# Patient Record
Sex: MALE | Race: BLACK OR AFRICAN AMERICAN | Employment: FULL TIME | URBAN - METROPOLITAN AREA
[De-identification: names, ages, dates, MRNs, and addresses within clinical notes are randomized per-mention and may not be internally consistent; named-entity substitution may affect disease eponyms.]

---

## 2017-09-24 ENCOUNTER — HOSPITAL ENCOUNTER (EMERGENCY)
Age: 32
Discharge: HOME | End: 2017-09-24
Attending: EMERGENCY MEDICINE
Payer: MEDICAID

## 2017-09-24 VITALS
WEIGHT: 120.19 LBS | HEIGHT: 67 IN | TEMPERATURE: 98.8 F | BODY MASS INDEX: 18.87 KG/M2 | DIASTOLIC BLOOD PRESSURE: 80 MMHG | HEART RATE: 96 BPM | SYSTOLIC BLOOD PRESSURE: 140 MMHG | OXYGEN SATURATION: 97 %

## 2017-09-24 DIAGNOSIS — K02.9 DENTAL CARIES: ICD-10-CM

## 2017-09-24 DIAGNOSIS — R05.8 PRODUCTIVE COUGH: ICD-10-CM

## 2017-09-24 DIAGNOSIS — K08.89 PAIN, DENTAL: Primary | ICD-10-CM

## 2017-09-24 DIAGNOSIS — J44.9 COPD (CHRONIC OBSTRUCTIVE PULMONARY DISEASE) (HCC): ICD-10-CM

## 2017-09-24 DIAGNOSIS — J45.909 ASTHMA: ICD-10-CM

## 2017-09-24 PROCEDURE — 99281 EMR DPT VST MAYX REQ PHY/QHP: CPT

## 2017-09-24 RX ORDER — AMOXICILLIN 500 MG/1
500 TABLET, FILM COATED ORAL 3 TIMES DAILY
Qty: 30 TAB | Refills: 0 | OUTPATIENT
Start: 2017-09-24 | End: 2022-10-06

## 2017-09-24 RX ORDER — BENZONATATE 200 MG/1
200 CAPSULE ORAL
Qty: 30 CAP | Refills: 0 | Status: SHIPPED | OUTPATIENT
Start: 2017-09-24 | End: 2017-10-01

## 2017-09-24 RX ORDER — ALBUTEROL SULFATE 90 UG/1
2 AEROSOL, METERED RESPIRATORY (INHALATION)
Qty: 1 INHALER | Refills: 0 | OUTPATIENT
Start: 2017-09-24 | End: 2022-10-25

## 2017-09-24 RX ORDER — HYDROCODONE BITARTRATE AND ACETAMINOPHEN 5; 325 MG/1; MG/1
1 TABLET ORAL
Qty: 12 TAB | Refills: 0 | OUTPATIENT
Start: 2017-09-24 | End: 2022-10-25

## 2017-09-24 NOTE — DISCHARGE INSTRUCTIONS
[employer] Activation    Thank you for requesting access to [employer]. Please follow the instructions below to securely access and download your online medical record. [employer] allows you to send messages to your doctor, view your test results, renew your prescriptions, schedule appointments, and more. How Do I Sign Up? 1. In your internet browser, go to [email or web address]  2. Click on the First Time User? Click Here link in the Sign In box. You will be redirect to the New Member Sign Up page. 3. Enter your [employer] Access Code exactly as it appears below. You will not need to use this code after youve completed the sign-up process. If you do not sign up before the expiration date, you must request a new code. [employer] Access Code: [email or web address]  Expires: 2017  7:33 PM (This is the date your [employer] access code will )    4. Enter the last four digits of your Social Security Number (xxxx) and Date of Birth (mm/dd/yyyy) as indicated and click Submit. You will be taken to the next sign-up page. 5. Create a [employer] ID. This will be your [employer] login ID and cannot be changed, so think of one that is secure and easy to remember. 6. Create a [employer] password. You can change your password at any time. 7. Enter your Password Reset Question and Answer. This can be used at a later time if you forget your password. 8. Enter your e-mail address. You will receive e-mail notification when new information is available in [address]. 9. Click Sign Up. You can now view and download portions of your medical record. 10. Click the Download Summary menu link to download a portable copy of your medical information. Additional Information    If you have questions, please visit the Frequently Asked Questions section of the [employer] website at [email or web address] Remember, [employer] is NOT to be used for urgent needs. For medical emergencies, dial 911.

## 2017-09-24 NOTE — ED PROVIDER NOTES
HPI Comments: 7:08 PM  The patient is a 36 y.o. Male, current daily smoker and marijuana user with hx of asthma, COPD, schizophrenia, bronchitis and sickle cell disease who presents to the ED with10/10 severe, constant left lower dental pain with associated ear pain x 1 day and with productive cough x 2 weeks. Pt explains that his cough first started when he moved here from [facility]. Has tried OTC medication for cough with no improvement. No f/c, n/v/d, cp reported. Does not have a dentist.  Reports allergic reaction to ibuprofen and asa. The history is provided by the patient. No  was used. Past Medical History:   Diagnosis Date    Arthritis     back problems    Asthma     Bipolar 1 disorder ([address]     COPD     Musculoskeletal disorder     Other ill-defined conditions(799.89)     scoliosis    Other ill-defined conditions(799.89)     sickle cell trait    Other ill-defined conditions(799.89)     chronic back pain    Psychiatric disorder     anxiety    Schizophrenia ([facility]     Scoliosis     Sickle cell anemia (HCC)        No past surgical history on file. No family history on file. Social History     Social History    Marital status: SINGLE     Spouse name: N/A    Number of children: N/A    Years of education: N/A     Occupational History    Not on file. Social History Main Topics    Smoking status: Current Every Day Smoker     Packs/day: 0.50     Years: 16.00     Types: Cigarettes    Smokeless tobacco: Not on file    Alcohol use No    Drug use: Yes     Special: Marijuana    Sexual activity: Yes     Partners: Female     Other Topics Concern    Not on file     Social History Narrative         ALLERGIES: Aspirin and Ibuprofen    Review of Systems   Constitutional: Negative. Negative for chills, diaphoresis, fatigue and fever. HENT: Positive for dental problem and ear pain. Negative for congestion, facial swelling and sore throat.          Left lower dental pain   Eyes: Negative. Negative for pain and redness. Respiratory: Positive for cough (productive). Negative for shortness of breath, wheezing and stridor. Cardiovascular: Negative. Negative for chest pain. Gastrointestinal: Negative. Negative for abdominal pain, diarrhea, nausea and vomiting. Endocrine: Negative. Genitourinary: Negative. Negative for dysuria, flank pain, frequency and hematuria. Musculoskeletal: Negative. Negative for back pain, myalgias, neck pain and neck stiffness. Skin: Negative. Negative for rash and wound. Allergic/Immunologic: Negative. Neurological: Negative. Negative for dizziness, seizures, syncope and headaches. Hematological: Negative. Psychiatric/Behavioral: Negative. All other systems reviewed and are negative. There were no vitals filed for this visit. Physical Exam   Constitutional: He is oriented to person, place, and time. He appears well-developed and well-nourished. No distress. HENT:   Head: Normocephalic. Right Ear: External ear normal.   Nose: Nose normal.   Mouth/Throat: Oropharynx is clear and moist.   Dental caries noted to the L lower molar region, no abscess noted, airway is patent, able to clear secretions, no trismus or uvular deviation noted. Neck: Normal range of motion. Neck supple. Cardiovascular: Normal rate, regular rhythm and normal heart sounds. Exam reveals no gallop and no friction rub. No murmur heard. Pulmonary/Chest: Effort normal and breath sounds normal. No stridor. No respiratory distress. He has no wheezes. He has no rales. Musculoskeletal: Normal range of motion. Lymphadenopathy:     He has no cervical adenopathy. Neurological: He is alert and oriented to person, place, and time. Coordination normal.   Gait is steady. Able to ambulate without difficulty. Skin: Skin is warm and dry. No rash noted. He is not diaphoretic. No erythema.    Psychiatric: He has a normal mood and affect. His behavior is normal. Thought content normal.   Nursing note and vitals reviewed. MDM  Number of Diagnoses or Management Options  Diagnosis management comments: Impression:  Productive cough, dental caries, dental pain    Patient is stable for discharge at this time. Rx for amoxicillin, norco, and refill of pt's albuterol given. Rest and follow-up with PCP/dentist this week. Return to the ED immediately for any new or worsening sx. Farnaz Regalado PA-C 7:26 PM     Risk of Complications, Morbidity, and/or Mortality  Presenting problems: low  Diagnostic procedures: low  Management options: low    Patient Progress  Patient progress: stable    ED Course       Procedures         Scribe Attestation      Shantell Medellin acting as a scribe for and in the presence of [place], [place]      September 24, 2017 at 7:19 [facility]       Provider Attestation:      I personally performed the services described in the documentation, reviewed the documentation, as recorded by the scribe in my presence, and it accurately and completely records my words and actions.  September 24, 2017 at 7:19 PM - [place], [place]

## 2022-10-06 ENCOUNTER — HOSPITAL ENCOUNTER (EMERGENCY)
Age: 37
Discharge: HOME | End: 2022-10-06
Attending: EMERGENCY MEDICINE
Payer: MEDICAID

## 2022-10-06 VITALS
DIASTOLIC BLOOD PRESSURE: 91 MMHG | HEIGHT: 67 IN | HEART RATE: 73 BPM | SYSTOLIC BLOOD PRESSURE: 130 MMHG | WEIGHT: 140 LBS | RESPIRATION RATE: 19 BRPM | TEMPERATURE: 98.2 F | OXYGEN SATURATION: 100 % | BODY MASS INDEX: 21.97 KG/M2

## 2022-10-06 DIAGNOSIS — J01.90 ACUTE BACTERIAL SINUSITIS: Primary | ICD-10-CM

## 2022-10-06 DIAGNOSIS — B96.89 ACUTE BACTERIAL SINUSITIS: Primary | ICD-10-CM

## 2022-10-06 DIAGNOSIS — H60.393 OTHER INFECTIVE ACUTE OTITIS EXTERNA OF BOTH EARS: ICD-10-CM

## 2022-10-06 DIAGNOSIS — Z72.0 TOBACCO ABUSE: ICD-10-CM

## 2022-10-06 PROCEDURE — 99283 EMERGENCY DEPT VISIT LOW MDM: CPT

## 2022-10-06 RX ORDER — FLUTICASONE PROPIONATE 50 MCG
2 SPRAY, SUSPENSION (ML) NASAL DAILY
Qty: 1 EACH | Refills: 0 | Status: SHIPPED | OUTPATIENT
Start: 2022-10-06

## 2022-10-06 RX ORDER — AMOXICILLIN AND CLAVULANATE POTASSIUM 875; 125 MG/1; MG/1
1 TABLET, FILM COATED ORAL 2 TIMES DAILY
Qty: 20 TABLET | Refills: 0 | Status: SHIPPED | OUTPATIENT
Start: 2022-10-06 | End: 2022-10-16

## 2022-10-06 NOTE — Clinical Note
[facility] EMERGENCY DEPT  [address] [id number] [phone]    Work/School Note    Date: 10/6/2022    To Whom It May concern:      João Guajardo was seen and treated today in the emergency room by the following provider(s):  Attending Provider: Ethan Villarreal MD.      João Guajardo is excused from work/school on 10/06/22. He is clear to return to work/school on 10/07/22.         Sincerely,          Mariia Gonzalez MD

## 2022-10-06 NOTE — ED PROVIDER NOTES
EMERGENCY DEPARTMENT HISTORY AND PHYSICAL EXAM      Date: 10/6/2022  Patient Name: Gabby Burris    History of Presenting Illness     Chief Complaint   Patient presents with    Sinus Pain     Per pt reports left sided facial pain, +nasal pain and left ear pain x 1 week, recently seen at [facility] and dx with sinus infection, was unable to get antibiotic prescription filled. History Provided By: Patient    HPI: Gabby Burris, 30 y.o. male with past medical history significant for arthritis, asthma, bipolar, COPD, scoliosis, and sickle cell trait who presents via private vehicle to the ED with cc of headache and pain behind the left eye for the past week. Patient tells me he was seen at [facility] emergency department last week and prescribed antibiotics and a nasal spray but did not get the medications filled because the pharmacy was out of them. He not know what medications he was prescribed but states his symptoms have progressively gotten worse. His headache is described as a pressure/aching pain that does not radiate. He also endorses left ear pain and fullness as well as nasal congestion. He denies any fevers, chills, cough, shortness of breath, or sore throat. PMHx: Arthritis, asthma, bipolar disorder, COPD, scoliosis, and sickle cell trait  Social Hx: Smokes 1/2 pack/day, denies alcohol use, occasional marijuana use    PCP: None    There are no other complaints, changes, or physical findings at this time. No current facility-administered medications on file prior to encounter. Current Outpatient Medications on File Prior to Encounter   Medication Sig Dispense Refill    HYDROcodone-acetaminophen (NORCO) 5-325 mg per tablet Take 1 Tab by mouth every four (4) hours as needed for Pain. Max Daily Amount: 6 Tabs. 12 Tab 0    albuterol (PROVENTIL HFA, VENTOLIN HFA, PROAIR HFA) 90 mcg/actuation inhaler Take 2 Puffs by inhalation every four (4) hours as needed for Wheezing.  1 Inhaler 0    [DISCONTINUED] amoxicillin 500 mg tab Take 500 mg by mouth three (3) times daily. 30 Tab 0    predniSONE (STERAPRED) 5 mg dose pack See administration instruction per 5mg dose pack 21 Tab 0    ipratropium-albuterol (COMBIVENT RESPIMAT)  mcg/actuation inhaler Take 1 Puff by inhalation every six (6) hours. 1 Inhaler 3    famotidine (PEPCID) 20 mg tablet Take 1 Tab by mouth two (2) times a day. 20 Tab 0    risperiDONE (RISPERDAL) 3 mg tablet Take 1 Tab by mouth two (2) times a day. 60 Tab 3    benztropine (COGENTIN) 1 mg tablet Take 0.5 Tabs by mouth two (2) times a day. 60 Tab 3    ALPRAZOLAM (XANAX PO) Take  by mouth. Past History     Past Medical History:  Past Medical History:   Diagnosis Date    Arthritis     back problems    Asthma     Bipolar 1 disorder ([facility]     COPD     Musculoskeletal disorder     Other ill-defined conditions     scoliosis    Other ill-defined conditions     sickle cell trait    Other ill-defined conditions     chronic back pain    Psychiatric disorder     anxiety    Sch[facility]ia ([facility]     Scoliosis     Sickle cell anemia (HCC)      Past Surgical History:  Past Surgical History:   Procedure Laterality Date    HX APPENDECTOMY       Family History:  No family history on file. Social History:  Social History     Tobacco Use    Smoking status: Every Day     Packs/day: 0.50     Years: 16.00     Pack years: 8.00     Types: Cigarettes    Smokeless tobacco: Never   Substance Use Topics    Alcohol use: No    Drug use: Yes     Types: Marijuana     Allergies: Allergies   Allergen Reactions    Aspirin Swelling    Ibuprofen Hives    Tylenol [Acetaminophen] Hives     Review of Systems   Review of Systems   Constitutional:  Negative for chills and fever. HENT:  Positive for congestion, ear pain, sinus pressure and sinus pain. Negative for hearing loss, rhinorrhea, sneezing, sore throat and voice change. Eyes:  Negative for redness and visual disturbance.    Respiratory: Negative for shortness of breath. Cardiovascular:  Negative for chest pain and leg swelling. Gastrointestinal:  Negative for abdominal pain, nausea and vomiting. Genitourinary:  Negative for difficulty urinating and frequency. Musculoskeletal:  Negative for back pain, myalgias and neck stiffness. Skin:  Negative for rash. Neurological:  Positive for headaches. Negative for dizziness, syncope and weakness. Hematological:  Negative for adenopathy. All other systems reviewed and are negative. Physical Exam   Physical Exam  Vitals and nursing note reviewed. Constitutional:       Appearance: Normal appearance. He is well-developed. HENT:      Head: Normocephalic and atraumatic. Right Ear: Tympanic membrane normal.      Left Ear: Tympanic membrane normal.      Ears:      Comments: Erythema of the bilateral external auditory canals     Nose: Mucosal edema present. Right Turbinates: Swollen. Left Turbinates: Swollen. Cardiovascular:      Rate and Rhythm: Normal rate and regular rhythm. Pulses: Normal pulses. Heart sounds: Normal heart sounds. Pulmonary:      Effort: Pulmonary effort is normal. No respiratory distress. Breath sounds: Normal breath sounds. Chest:      Chest wall: No tenderness. Abdominal:      General: Bowel sounds are normal.      Palpations: Abdomen is soft. Tenderness: There is no abdominal tenderness. There is no guarding or rebound. Musculoskeletal:      Cervical back: Full passive range of motion without pain, normal range of motion and neck supple. Skin:     General: Skin is warm and dry. Findings: No erythema or rash. Neurological:      Mental Status: He is alert and oriented to person, place, and time. Psychiatric:         Speech: Speech normal.         Behavior: Behavior normal.         Thought Content:  Thought content normal.         Judgment: Judgment normal.     Diagnostic Study Results   Labs -   No results found for this or any previous visit (from the past 12 hour(s)). Radiologic Studies -   No orders to display     No results found. Medical Decision Making   I am the first provider for this patient. I reviewed the vital signs, available nursing notes, past medical history, past surgical history, family history and social history. Vital Signs-Reviewed the patient's vital signs. Patient Vitals for the past 24 hrs:   Temp Pulse Resp BP SpO2   10/06/22 0821 98.2 °F (36.8 °C) 73 19 (!) 130/91 100 %     Pulse Oximetry Analysis - 100% on RA (normal)    Records Reviewed: Nursing Notes and Old Medical Records    Provider Notes (Medical Decision Making):   72-year-old male presents with headache, facial pain, and sinus congestion for the past week. He has erythema of the bilateral external auditory canals but no signs of otitis media. Given the duration of his sinus symptoms, will treat with antibiotics and Flonase and have him follow-up with outpatient primary care. ED Course:   Initial assessment performed. The patients presenting problems have been discussed, and they are in agreement with the care plan formulated and outlined with them. I have encouraged them to ask questions as they arise throughout their visit. Tobacco Cessation Counseling   I spent 5 minutes discussing the medical risks of prolonged smoking habits and advised the patient of the benefits of the cessation of smoking, providing specific suggestions on how to quit. Miquel Figueroa MD    Progress Note:   Updated pt on all returned results and findings. Discussed the importance of proper follow up as referred below along with return precautions. Pt in agreement with the care plan and expresses agreement with and understanding of all items discussed. Disposition:  Discharge Note:  The pt is ready for discharge. The pt's signs, symptoms, diagnosis, and discharge instructions have been discussed and pt has conveyed their understanding.  The pt is to follow up as recommended or return to ER should their symptoms worsen. Plan has been discussed and pt is in agreement. PLAN:  1. Current Discharge Medication List        START taking these medications    Details   amoxicillin-clavulanate (Augmentin) 875-125 mg per tablet Take 1 Tablet by mouth two (2) times a day for 10 days. Qty: 20 Tablet, Refills: 0  Start date: 10/6/2022, End date: 10/16/2022      fluticasone propionate (FLONASE) 50 mcg/actuation nasal spray 2 Sprays by Both Nostrils route daily. Qty: 1 Each, Refills: 0  Start date: 10/6/2022           2. Follow-up Information       Follow up With Specialties Details Why [address]  Call  to arrange primary care [address]  [facility], [address] [place] [address] [address]    [address]  Call  to arrange primary care [place]  [phone]    [facility] EMERGENCY DEPT Emergency Medicine  As needed, If symptoms worsen [facility]  [phone]          Return to ED if worse     Diagnosis     Clinical Impression:   1. Acute bacterial sinusitis    2. Other infective acute otitis externa of both ears    3. Tobacco abuse            Please note that this dictation was completed with Dragon, computer voice recognition software. Quite often unanticipated grammatical, syntax, homophones, and other interpretive errors are inadvertently transcribed by the computer software. Please disregard these errors. Additionally, please excuse any errors that have escaped final proofreading.

## 2022-10-06 NOTE — ED NOTES
Patient presents with headache, left eye pain X1 week. States he was seen at an outside facility last week and sent home on abts and nasal spray. Has been unable to get the abt d/t [facility] being out of stock. States the sinus pain has been increasing since then. Emergency Department Nursing Plan of Care       The Nursing Plan of Care is developed from the Nursing assessment and Emergency Department Attending provider initial evaluation. The plan of care may be reviewed in the ED Provider note.     The Plan of Care was developed with the following considerations:   Patient / Family readiness to learn indicated by:verbalized understanding and appropriate questions asked  Persons(s) to be included in education: patient  Barriers to Learning/Limitations:No    Signed     Orlando Ash RN    10/6/2022   8:29 A

## 2022-10-25 ENCOUNTER — HOSPITAL ENCOUNTER (EMERGENCY)
Age: 37
Discharge: HOME | End: 2022-10-25
Attending: EMERGENCY MEDICINE
Payer: MEDICAID

## 2022-10-25 VITALS
RESPIRATION RATE: 18 BRPM | SYSTOLIC BLOOD PRESSURE: 148 MMHG | WEIGHT: 134 LBS | OXYGEN SATURATION: 100 % | TEMPERATURE: 98.2 F | HEART RATE: 73 BPM | BODY MASS INDEX: 21.03 KG/M2 | HEIGHT: 67 IN | DIASTOLIC BLOOD PRESSURE: 116 MMHG

## 2022-10-25 DIAGNOSIS — F17.200 SMOKING ADDICTION: ICD-10-CM

## 2022-10-25 DIAGNOSIS — J01.90 ACUTE SINUSITIS, RECURRENCE NOT SPECIFIED, UNSPECIFIED LOCATION: Primary | ICD-10-CM

## 2022-10-25 DIAGNOSIS — R03.0 ELEVATED BLOOD PRESSURE READING: ICD-10-CM

## 2022-10-25 PROCEDURE — 99283 EMERGENCY DEPT VISIT LOW MDM: CPT

## 2022-10-25 RX ORDER — ALBUTEROL SULFATE 90 UG/1
2 AEROSOL, METERED RESPIRATORY (INHALATION)
Qty: 1 EACH | Refills: 1 | Status: SHIPPED | OUTPATIENT
Start: 2022-10-25

## 2022-10-25 RX ORDER — PREDNISONE 10 MG/1
TABLET ORAL
Qty: 21 TABLET | Refills: 0 | Status: SHIPPED | OUTPATIENT
Start: 2022-10-25

## 2022-10-25 RX ORDER — CETIRIZINE HYDROCHLORIDE, PSEUDOEPHEDRINE HYDROCHLORIDE 5; 120 MG/1; MG/1
1 TABLET, FILM COATED, EXTENDED RELEASE ORAL 2 TIMES DAILY
Qty: 30 TABLET | Refills: 0 | Status: SHIPPED | OUTPATIENT
Start: 2022-10-25

## 2022-10-25 NOTE — ED PROVIDER NOTES
EMERGENCY DEPARTMENT HISTORY AND PHYSICAL EXAM      Please note that this dictation was completed with PixelFlow, the computer voice recognition software. Quite often unanticipated grammatical, syntax, homophones, and other interpretive errors are inadvertently transcribed by the computer software. Please disregard these errors. Please excuse any errors that have escaped final proofreading. Date: 10/25/2022  Patient Name: Dashawn Dunham    History of Presenting Illness     Chief Complaint   Patient presents with    Sinus Pain       History Provided By: Patient    HPI: Dashawn Dunham, 30 y.o. male with allergies to Tylenol, Ibuprofen, and Aspirin presents ambulatory to the ED with cc of several months of severe sinus pressure and congestion. Patient was seen 10/06/2022 for the same issue; he was prescribed Augmentin and Flonase but has not had relief of symptoms. Denies cough, N/V/D, chest pain or shortness of breath. Unfortunately, he does continue to smoke cigarettes. Chief Complaint: Sinus Pressure  Duration: 2-3 Months  Timing:  Constant  Location: Maxillary and Frontal sinuses  Quality: Pressure  Severity: Moderate  Modifying Factors: No relief with Benadryl, steam, or Flonase. Associated Symptoms: denies any other associated signs or symptoms    There are no other complaints, changes, or physical findings at this time. PCP: None    Current Outpatient Medications   Medication Sig Dispense Refill    predniSONE (STERAPRED DS) 10 mg dose pack Per Dose Pack instructions 21 Tablet 0    albuterol (PROVENTIL HFA, VENTOLIN HFA, PROAIR HFA) 90 mcg/actuation inhaler Take 2 Puffs by inhalation every four (4) hours as needed for Wheezing or Cough. 1 Each 1    cetirizine-pseudoePHEDrine (ZyrTEC-D) 5-120 mg Tb12 Take 1 Tablet by mouth two (2) times a day. 30 Tablet 0    fluticasone propionate (FLONASE) 50 mcg/actuation nasal spray 2 Sprays by Both Nostrils route daily.  1 Each 0    ipratropium-albuterol (COMBIVENT RESPIMAT)  mcg/actuation inhaler Take 1 Puff by inhalation every six (6) hours. 1 Inhaler 3    famotidine (PEPCID) 20 mg tablet Take 1 Tab by mouth two (2) times a day. 20 Tab 0    risperiDONE (RISPERDAL) 3 mg tablet Take 1 Tab by mouth two (2) times a day. 60 Tab 3    benztropine (COGENTIN) 1 mg tablet Take 0.5 Tabs by mouth two (2) times a day. 60 Tab 3    ALPRAZOLAM (XANAX PO) Take  by mouth. Past History     Past Medical History:  Past Medical History:   Diagnosis Date    Arthritis     back problems    Asthma     Bipolar 1 disorder ([facility] [address]     COPD     Musculoskeletal disorder     Other ill-defined conditions(799.89)     scoliosis    Other ill-defined conditions(799.89)     sickle cell trait    Other ill-defined conditions(799.89)     chronic back pain    Psychiatric disorder     anxiety    Schizophrenia ([facility] [address]     Scoliosis     Sickle cell anemia ([facility] [address]        Past Surgical History:  Past Surgical History:   Procedure Laterality Date    HX APPENDECTOMY         Family History:  History reviewed. No pertinent family history. Social History:  Social History     Tobacco Use    Smoking status: Every Day     Packs/day: 0.50     Years: 16.00     Pack years: 8.00     Types: Cigarettes    Smokeless tobacco: Never   Substance Use Topics    Alcohol use: No    Drug use: Yes     Types: Marijuana       Allergies: Allergies   Allergen Reactions    Aspirin Swelling    Ibuprofen Hives    Tylenol [Acetaminophen] Hives     Review of Systems   Review of Systems   Constitutional:  Positive for chills and fever (subjective). HENT:  Positive for congestion, sinus pressure and sinus pain. Negative for sore throat. Eyes:  Negative for pain. Respiratory:  Negative for shortness of breath. Cardiovascular:  Negative for chest pain. Gastrointestinal:  Negative for abdominal pain. Genitourinary:  Negative for flank pain. Musculoskeletal:  Negative for back pain. Skin:  Negative for rash. Allergic/Immunologic: Positive for environmental allergies. Neurological:  Negative for headaches. Physical Exam   Physical Exam  Vitals and nursing note reviewed. Constitutional:       General: He is not in acute distress. Appearance: He is well-developed. He is not toxic-appearing. HENT:      Head: Normocephalic and atraumatic. No right periorbital erythema or left periorbital erythema. Right Ear: Tympanic membrane, ear canal and external ear normal.      Left Ear: Tympanic membrane, ear canal and external ear normal.      Nose: Congestion present. Mouth/Throat:      Mouth: Mucous membranes are moist.   Eyes:      General: No scleral icterus. Right eye: No discharge. Left eye: No discharge. Conjunctiva/sclera: Conjunctivae normal.      Pupils: Pupils are equal, round, and reactive to light. Cardiovascular:      Rate and Rhythm: Normal rate and regular rhythm. Pulmonary:      Effort: Pulmonary effort is normal. No respiratory distress. Breath sounds: Normal breath sounds. Abdominal:      Palpations: Abdomen is soft. Tenderness: There is no abdominal tenderness. Musculoskeletal:         General: Normal range of motion. Cervical back: Normal range of motion. Skin:     General: Skin is warm and dry. Findings: No rash. Neurological:      General: No focal deficit present. Mental Status: He is alert and oriented to person, place, and time. Cranial Nerves: No cranial nerve deficit. Sensory: No sensory deficit. Psychiatric:         Speech: Speech normal.     Diagnostic Study Results     Labs -   No results found for this or any previous visit (from the past 12 hour(s)). Radiologic Studies -   No orders to display     CT Results  (Last 48 hours)      None          CXR Results  (Last 48 hours)      None          Medical Decision Making   I am the first provider for this patient.     I reviewed the vital signs, available nursing notes, past medical history, past surgical history, family history and social history. Vital Signs-Reviewed the patient's vital signs. Patient Vitals for the past 12 hrs:   Temp Pulse Resp BP SpO2   10/25/22 0931 98.2 °F (36.8 °C) 73 18 (!) 148/116 100 %       Pulse Oximetry Analysis - 100% on RA    Records Reviewed: Nursing Notes and Old Medical Records    Provider Notes (Medical Decision Making):   DDx: Viral URI, acute sinusitis, smoking addiction    Afebrile and well-appearing. Patient presents with sinus congestion and fullness for week or longer. He tells us he did complete a course of oral antibiotics with no improvement. On exam, he is well-appearing. He does have some frontal sinus pressure. Breathing is unlabored and lungs are clear. Additional testing deferred. We will offer medications to help improve his sinus pressure and congestion. He is urged to quit smoking. He is made aware that his blood pressure is high and he is encouraged to follow-up with primary care once outside of this environment. Return precautions for worsening symptoms or any concerns. TOBACCO COUNSELING:  Spent 1-5 minutes discussing the risks of smoking and the benefits of smoking cessation as well as the long term sequelae of smoking with the pt who verbalized his understanding. Reviewed strategies for success, including gradually decreasing the number of cigarettes smoked a day. HYPERTENSION COUNSELING:  Patient denies chest pain, headache, shortness of breath. Patient is made aware of their elevated blood pressure and is instructed to follow up this week with their Primary Care for a recheck. Patient is counseled regarding consequences of chronic, uncontrolled hypertension including kidney disease, heart disease, stroke or even death. Patient states their understanding. ED Course:   Initial assessment performed.  The patients presenting problems have been discussed, and they are in agreement with the care plan formulated and outlined with them. I have encouraged them to ask questions as they arise throughout their visit. Disposition:  Discharge    PLAN:  1. Discharge Medication List as of 10/25/2022 10:47 AM        START taking these medications    Details   predniSONE (STERAPRED DS) 10 mg dose pack Per Dose Pack instructions, Normal, Disp-21 Tablet, R-0      albuterol (PROVENTIL HFA, VENTOLIN HFA, PROAIR HFA) 90 mcg/actuation inhaler Take 2 Puffs by inhalation every four (4) hours as needed for Wheezing or Cough., Normal, Disp-1 Each, R-1      cetirizine-pseudoePHEDrine (ZyrTEC-D) 5-120 mg Tb12 Take 1 Tablet by mouth two (2) times a day., Normal, Disp-30 Tablet, R-0           CONTINUE these medications which have NOT CHANGED    Details   fluticasone propionate (FLONASE) 50 mcg/actuation nasal spray 2 Sprays by Both Nostrils route daily. , Normal, Disp-1 Each, R-0      ipratropium-albuterol (COMBIVENT RESPIMAT)  mcg/actuation inhaler Take 1 Puff by inhalation every six (6) hours. , Normal, Disp-1 Inhaler, R-3      famotidine (PEPCID) 20 mg tablet Take 1 Tab by mouth two (2) times a day., Print, Disp-20 Tab, R-0      risperiDONE (RISPERDAL) 3 mg tablet Take 1 Tab by mouth two (2) times a day., Normal, Disp-60 Tab, R-3      benztropine (COGENTIN) 1 mg tablet Take 0.5 Tabs by mouth two (2) times a day., Normal, Disp-60 Tab, R-3      ALPRAZOLAM (XANAX PO) Take  by mouth., Historical Med           2. Follow-up Information       Follow up With Specialties Details Why Contact Info    Terri  Call  PRIMARY CARE: call to schedule follow up regarding your ER visit and high blood pressure [address]   [phone]          Return to ED if worse     Diagnosis     Clinical Impression:   1. Acute sinusitis, recurrence not specified, unspecified location    2. Smoking addiction    3.  Elevated blood pressure reading

## 2022-10-25 NOTE — LETTER
[facility] EMERGENCY DEPT  [address] [id number] [phone]    Work/School Note    Date: 10/25/2022    To Whom It May concern:    Catalina Crawford was seen and treated today in the emergency room by the following provider(s):  Attending Provider: Jj García MD  Physician Assistant: BRISA Addison.      Catalina Crawford may return to work on 28OCT2022.     Sincerely,          BRISA Shen

## 2022-10-25 NOTE — ED NOTES
Was seen earlier this month for sinus infection. States he took all but two of the prescribed antibiotics for sinus infection.   Returns for increased pain and facial pressure, especially behind left eye

## 2022-10-25 NOTE — ED TRIAGE NOTES
Patient presents to the ED with c/o sinus pressure and pain that is chronic. Pt reports ear fullness. Pt reports that he was seen recently and given antibiotics.  Pt reports needing a work note,

## 2023-05-15 ENCOUNTER — HOSPITAL ENCOUNTER (EMERGENCY)
Facility: HOSPITAL | Age: 38
Discharge: HOME | End: 2023-05-15
Attending: EMERGENCY MEDICINE
Payer: COMMERCIAL

## 2023-05-15 VITALS
RESPIRATION RATE: 18 BRPM | HEART RATE: 78 BPM | BODY MASS INDEX: 19.62 KG/M2 | HEIGHT: 67 IN | SYSTOLIC BLOOD PRESSURE: 118 MMHG | TEMPERATURE: 98.1 F | DIASTOLIC BLOOD PRESSURE: 60 MMHG | WEIGHT: 125 LBS | OXYGEN SATURATION: 100 %

## 2023-05-15 DIAGNOSIS — M54.2 NECK PAIN: Primary | ICD-10-CM

## 2023-05-15 DIAGNOSIS — S39.012A BACK STRAIN, INITIAL ENCOUNTER: ICD-10-CM

## 2023-05-15 LAB
BASOPHILS # BLD: 0 K/UL (ref 0–0.1)
BASOPHILS NFR BLD: 1 % (ref 0–1)
DIFFERENTIAL METHOD BLD: NORMAL
EOSINOPHIL # BLD: 0.1 K/UL (ref 0–0.4)
EOSINOPHIL NFR BLD: 1 % (ref 0–7)
ERYTHROCYTE [DISTWIDTH] IN BLOOD BY AUTOMATED COUNT: 13.6 % (ref 11.5–14.5)
HCT VFR BLD AUTO: 37.9 % (ref 36.6–50.3)
HGB BLD-MCNC: 13.6 G/DL (ref 12.1–17)
IMM GRANULOCYTES # BLD AUTO: 0 K/UL (ref 0–0.04)
IMM GRANULOCYTES NFR BLD AUTO: 0 % (ref 0–0.5)
LYMPHOCYTES # BLD: 1.4 K/UL (ref 0.8–3.5)
LYMPHOCYTES NFR BLD: 28 % (ref 12–49)
MCH RBC QN AUTO: 29.4 PG (ref 26–34)
MCHC RBC AUTO-ENTMCNC: 35.9 G/DL (ref 30–36.5)
MCV RBC AUTO: 82 FL (ref 80–99)
MONOCYTES # BLD: 0.4 K/UL (ref 0–1)
MONOCYTES NFR BLD: 7 % (ref 5–13)
NEUTS SEG # BLD: 3.3 K/UL (ref 1.8–8)
NEUTS SEG NFR BLD: 63 % (ref 32–75)
NRBC # BLD: 0 K/UL (ref 0–0.01)
NRBC BLD-RTO: 0 PER 100 WBC
PLATELET # BLD AUTO: 280 K/UL (ref 150–400)
PMV BLD AUTO: 9.8 FL (ref 8.9–12.9)
RBC # BLD AUTO: 4.62 M/UL (ref 4.1–5.7)
RETICS # AUTO: 0.07 M/UL (ref 0.03–0.1)
RETICS/RBC NFR AUTO: 1.5 % (ref 0.7–2.1)
WBC # BLD AUTO: 5.2 K/UL (ref 4.1–11.1)

## 2023-05-15 PROCEDURE — 85045 AUTOMATED RETICULOCYTE COUNT: CPT

## 2023-05-15 PROCEDURE — 99284 EMERGENCY DEPT VISIT MOD MDM: CPT

## 2023-05-15 PROCEDURE — 96374 THER/PROPH/DIAG INJ IV PUSH: CPT

## 2023-05-15 PROCEDURE — 6370000000 HC RX 637 (ALT 250 FOR IP): Performed by: EMERGENCY MEDICINE

## 2023-05-15 PROCEDURE — 36415 COLL VENOUS BLD VENIPUNCTURE: CPT

## 2023-05-15 PROCEDURE — 85025 COMPLETE CBC W/AUTO DIFF WBC: CPT

## 2023-05-15 PROCEDURE — 6360000002 HC RX W HCPCS: Performed by: EMERGENCY MEDICINE

## 2023-05-15 RX ORDER — METHOCARBAMOL 750 MG/1
750 TABLET, FILM COATED ORAL 4 TIMES DAILY
Qty: 24 TABLET | Refills: 0 | Status: SHIPPED | OUTPATIENT
Start: 2023-05-15 | End: 2023-05-21

## 2023-05-15 RX ORDER — MELOXICAM 15 MG/1
15 TABLET ORAL DAILY PRN
Qty: 30 TABLET | Refills: 0 | Status: SHIPPED | OUTPATIENT
Start: 2023-05-15

## 2023-05-15 RX ORDER — KETOROLAC TROMETHAMINE 30 MG/ML
30 INJECTION, SOLUTION INTRAMUSCULAR; INTRAVENOUS
Status: COMPLETED | OUTPATIENT
Start: 2023-05-15 | End: 2023-05-15

## 2023-05-15 RX ORDER — LIDOCAINE 50 MG/G
1 PATCH TOPICAL DAILY
Qty: 10 PATCH | Refills: 0 | Status: SHIPPED | OUTPATIENT
Start: 2023-05-15 | End: 2023-05-25

## 2023-05-15 RX ORDER — METHOCARBAMOL 500 MG/1
1000 TABLET, FILM COATED ORAL ONCE
Status: COMPLETED | OUTPATIENT
Start: 2023-05-15 | End: 2023-05-15

## 2023-05-15 RX ADMIN — METHOCARBAMOL TABLETS 1000 MG: 500 TABLET, COATED ORAL at 11:56

## 2023-05-15 RX ADMIN — KETOROLAC TROMETHAMINE 30 MG: 30 INJECTION, SOLUTION INTRAMUSCULAR; INTRAVENOUS at 11:55

## 2023-05-15 ASSESSMENT — PAIN DESCRIPTION - LOCATION
LOCATION: BACK
LOCATION: BACK
LOCATION: BACK;NECK

## 2023-05-15 ASSESSMENT — PAIN SCALES - GENERAL
PAINLEVEL_OUTOF10: 0
PAINLEVEL_OUTOF10: 10
PAINLEVEL_OUTOF10: 9

## 2023-05-15 ASSESSMENT — PAIN DESCRIPTION - FREQUENCY: FREQUENCY: CONTINUOUS

## 2023-05-15 ASSESSMENT — LIFESTYLE VARIABLES
HOW MANY STANDARD DRINKS CONTAINING ALCOHOL DO YOU HAVE ON A TYPICAL DAY: PATIENT DOES NOT DRINK
HOW OFTEN DO YOU HAVE A DRINK CONTAINING ALCOHOL: NEVER

## 2023-05-15 ASSESSMENT — PAIN DESCRIPTION - DESCRIPTORS: DESCRIPTORS: DULL;SHARP

## 2023-05-15 ASSESSMENT — PAIN DESCRIPTION - PAIN TYPE: TYPE: ACUTE PAIN

## 2023-05-15 ASSESSMENT — PAIN DESCRIPTION - ORIENTATION
ORIENTATION: RIGHT
ORIENTATION: RIGHT

## 2023-05-15 ASSESSMENT — PAIN - FUNCTIONAL ASSESSMENT
PAIN_FUNCTIONAL_ASSESSMENT: NONE - DENIES PAIN
PAIN_FUNCTIONAL_ASSESSMENT: 0-10

## 2023-05-15 NOTE — ED PROVIDER NOTES
Every Day     Packs/day: 0.50     Types: Cigarettes    Smokeless tobacco: Never   Substance Use Topics    Alcohol use: No    Drug use: Yes     Types: Marijuana Alfornia Cashing)       MEDICATIONS:  No current facility-administered medications for this encounter. Current Outpatient Medications   Medication Sig Dispense Refill    methocarbamol (ROBAXIN) 750 MG tablet Take 1 tablet by mouth 4 times daily for 6 days 24 tablet 0    meloxicam (MOBIC) 15 MG tablet Take 1 tablet by mouth daily as needed for Pain 30 tablet 0    lidocaine (LIDODERM) 5 % Place 1 patch onto the skin daily for 10 days 12 hours on, 12 hours off. 10 patch 0       ALLERGIES:  Allergies   Allergen Reactions    Nsaids Hives    Tylenol [Acetaminophen] Hives       SOCIAL DETERMINANTS OF HEALTH:  Social Determinants of Health     Tobacco Use: High Risk    Smoking Tobacco Use: Every Day    Smokeless Tobacco Use: Never    Passive Exposure: Not on file   Alcohol Use: Not At Risk    Frequency of Alcohol Consumption: Never    Average Number of Drinks: Patient does not drink    Frequency of Binge Drinking: Never   Financial Resource Strain: Not on file   Food Insecurity: Not on file   Transportation Needs: Not on file   Physical Activity: Not on file   Stress: Not on file   Social Connections: Not on file   Intimate Partner Violence: Not on file   Depression: Not on file   Housing Stability: Not on file       Review of Systems     Negative except as listed above in HPI. Physical Exam     Vitals:    05/15/23 1033 05/15/23 1035 05/15/23 1200   BP: 114/67  118/60   Pulse: 78  78   Resp: 16  18   Temp:  98.1 °F (36.7 °C)    TempSrc: Oral Oral    SpO2: 100%  100%   Weight: 56.7 kg (125 lb)     Height: 1.702 m (5' 7\")         Physical Exam  Vitals and nursing note reviewed. Constitutional:       Appearance: Normal appearance. HENT:      Head: Normocephalic. Cardiovascular:      Rate and Rhythm: Normal rate. Heart sounds: No murmur heard.   Pulmonary:

## 2023-05-15 NOTE — ED NOTES

## 2023-05-15 NOTE — ED TRIAGE NOTES
Pt reports neck and back pain that started after a long walk the other day. Pt reports he has a PMH of scoliosis and sickle cell and is unsure if this is a crisis. Pt denies taking anything for the pain.

## 2023-05-26 RX ORDER — BENZTROPINE MESYLATE 1 MG/1
0.5 TABLET ORAL 2 TIMES DAILY
COMMUNITY
Start: 2013-09-13

## 2023-05-26 RX ORDER — FAMOTIDINE 20 MG/1
20 TABLET, FILM COATED ORAL 2 TIMES DAILY
COMMUNITY
Start: 2013-10-10

## 2023-05-26 RX ORDER — PREDNISONE 10 MG/1
TABLET ORAL
COMMUNITY
Start: 2022-10-25

## 2023-05-26 RX ORDER — RISPERIDONE 3 MG/1
3 TABLET ORAL 2 TIMES DAILY
COMMUNITY
Start: 2013-09-13

## 2023-05-26 RX ORDER — FLUTICASONE PROPIONATE 50 MCG
2 SPRAY, SUSPENSION (ML) NASAL DAILY
COMMUNITY
Start: 2022-10-06

## 2023-05-26 RX ORDER — ALBUTEROL SULFATE 90 UG/1
2 AEROSOL, METERED RESPIRATORY (INHALATION) EVERY 4 HOURS PRN
COMMUNITY
Start: 2022-10-25

## 2023-05-26 RX ORDER — CETIRIZINE HYDROCHLORIDE, PSEUDOEPHEDRINE HYDROCHLORIDE 5; 120 MG/1; MG/1
1 TABLET, FILM COATED, EXTENDED RELEASE ORAL 2 TIMES DAILY
COMMUNITY
Start: 2022-10-25

## 2023-09-01 ENCOUNTER — HOSPITAL ENCOUNTER (EMERGENCY)
Facility: HOSPITAL | Age: 38
Discharge: HOME | End: 2023-09-01
Payer: COMMERCIAL

## 2023-09-01 VITALS
RESPIRATION RATE: 17 BRPM | HEIGHT: 68 IN | HEART RATE: 63 BPM | OXYGEN SATURATION: 99 % | DIASTOLIC BLOOD PRESSURE: 74 MMHG | SYSTOLIC BLOOD PRESSURE: 117 MMHG | BODY MASS INDEX: 18.79 KG/M2 | WEIGHT: 124 LBS | TEMPERATURE: 98.4 F

## 2023-09-01 DIAGNOSIS — J01.90 ACUTE NON-RECURRENT SINUSITIS, UNSPECIFIED LOCATION: Primary | ICD-10-CM

## 2023-09-01 PROCEDURE — 99283 EMERGENCY DEPT VISIT LOW MDM: CPT

## 2023-09-01 RX ORDER — OXYMETAZOLINE HYDROCHLORIDE 0.05 G/100ML
2 SPRAY NASAL 2 TIMES DAILY
Qty: 15 ML | Refills: 3 | Status: SHIPPED | OUTPATIENT
Start: 2023-09-01

## 2023-09-01 RX ORDER — CETIRIZINE HYDROCHLORIDE 10 MG/1
10 TABLET ORAL DAILY
Qty: 30 TABLET | Refills: 0 | Status: SHIPPED | OUTPATIENT
Start: 2023-09-01 | End: 2023-10-01

## 2023-09-01 RX ORDER — AMOXICILLIN 875 MG/1
875 TABLET, COATED ORAL 2 TIMES DAILY
Qty: 20 TABLET | Refills: 0 | Status: SHIPPED | OUTPATIENT
Start: 2023-09-01 | End: 2023-09-11

## 2023-09-01 ASSESSMENT — PAIN DESCRIPTION - PAIN TYPE: TYPE: ACUTE PAIN

## 2023-09-01 ASSESSMENT — PAIN DESCRIPTION - LOCATION: LOCATION: EAR

## 2023-09-01 ASSESSMENT — ENCOUNTER SYMPTOMS
BACK PAIN: 0
ABDOMINAL PAIN: 0
SINUS PAIN: 1
SHORTNESS OF BREATH: 0

## 2023-09-01 ASSESSMENT — PAIN SCALES - GENERAL: PAINLEVEL_OUTOF10: 9

## 2023-09-01 ASSESSMENT — PAIN DESCRIPTION - ORIENTATION: ORIENTATION: LEFT

## 2023-09-01 ASSESSMENT — PAIN DESCRIPTION - DESCRIPTORS: DESCRIPTORS: ACHING

## 2023-09-01 ASSESSMENT — PAIN - FUNCTIONAL ASSESSMENT: PAIN_FUNCTIONAL_ASSESSMENT: 0-10

## 2023-11-17 ENCOUNTER — HOSPITAL ENCOUNTER (EMERGENCY)
Facility: HOSPITAL | Age: 38
Discharge: HOME | End: 2023-11-17
Payer: COMMERCIAL

## 2023-11-17 VITALS
BODY MASS INDEX: 19.7 KG/M2 | TEMPERATURE: 99.1 F | HEIGHT: 68 IN | RESPIRATION RATE: 16 BRPM | OXYGEN SATURATION: 98 % | WEIGHT: 130 LBS | DIASTOLIC BLOOD PRESSURE: 77 MMHG | HEART RATE: 85 BPM | SYSTOLIC BLOOD PRESSURE: 126 MMHG

## 2023-11-17 DIAGNOSIS — H92.01 OTALGIA OF RIGHT EAR: Primary | ICD-10-CM

## 2023-11-17 PROCEDURE — 99283 EMERGENCY DEPT VISIT LOW MDM: CPT

## 2023-11-17 RX ORDER — NEOMYCIN SULFATE, POLYMYXIN B SULFATE, HYDROCORTISONE 3.5; 10000; 1 MG/ML; [USP'U]/ML; MG/ML
1 SOLUTION/ DROPS AURICULAR (OTIC) EVERY 8 HOURS SCHEDULED
Qty: 1 EACH | Refills: 0 | Status: SHIPPED | OUTPATIENT
Start: 2023-11-17 | End: 2023-11-27

## 2023-11-17 ASSESSMENT — PAIN - FUNCTIONAL ASSESSMENT: PAIN_FUNCTIONAL_ASSESSMENT: 0-10

## 2023-11-17 ASSESSMENT — PAIN SCALES - GENERAL: PAINLEVEL_OUTOF10: 10

## 2023-11-17 NOTE — ED NOTES
Patient (s)  given copy of dc instructions and 1 script(s). Patient (s)  verbalized understanding of instructions and script (s). Patient given a current medication reconciliation form and verbalized understanding of their medications. Patient (s) verbalized understanding of the importance of discussing medications with his or her physician or clinic they will be following up with. Patient alert and oriented and in no acute distress. Patient discharged home ambulatory with a steady gait.       Kelsi Solano RN  11/17/23 5245

## 2023-11-17 NOTE — ED TRIAGE NOTES
TRIAGE NOTE:  Patient here with c/o otalgia. Patient states right ear worse than left. Patient states that about a year ago he was diagnosed with \"chronic sinus\" and states that ever since then he has had ongoing congestion issues. Patient states \"the same stuff that's coming out of my nose is now coming out of my ears! \"  Patient denies fevers, denies cough at present time. Patient states that he was \"digging\" in his right ear last night and got out some \"gunk\" followed by blood. Patient shows picture on his phone on dark colored snot, stating \"that's what came from my nose, and its just like that in my ear. \"

## 2023-11-17 NOTE — ED PROVIDER NOTES
[facility] EMERGENCY DEPT  EMERGENCY DEPARTMENT ENCOUNTER         Pt Name: Dangelo Gonzalez  MRN: [id number]  [address] 1985  Date of evaluation: 11/17/2023  Provider: Nestor Elmore PA-C   PCP: Heather Reece MD  Note Started: 3:58 PM EST 11/17/23     CHIEF COMPLAINT       Chief Complaint   Patient presents with    Otalgia     Right ear, and nasal congestion          HISTORY OF PRESENT ILLNESS: 1 or more elements      History From: Patient  HPI Limitations: None     Dangelo Gonzalez is a 41 y.o. male who presents for ear pain x2 days. He rates discomfort 10 out of 10. He reports that he has chronic nasal congestion Jadali. .  Fevers or chills, no cough. Nursing Notes were all reviewed and agreed with or any disagreements were addressed in the HPI. REVIEW OF SYSTEMS      Review of Systems     Positives and Pertinent negatives as per HPI. PAST HISTORY     Past Medical History:  Past Medical History:   Diagnosis Date    Arthritis     back problems    Asthma     Bipolar 1 disorder ([address])     Musculoskeletal disorder     Other ill-defined conditions(799.89)     chronic back pain    Other ill-defined conditions(799.89)     sickle cell trait    Other ill-defined conditions(799.89)     scoliosis    Psychiatric disorder     anxiety    Schizophrenia ([address])     Scoliosis     Sickle cell anemia ([facility])        Past Surgical History:  Past Surgical History:   Procedure Laterality Date    APPENDECTOMY         Family History:  No family history on file. Social History:  Social History     Tobacco Use    Smoking status: Every Day     Packs/day: .5     Types: Cigarettes    Smokeless tobacco: Never   Vaping Use    Vaping Use: Never used   Substance Use Topics    Alcohol use: No    Drug use: Yes     Types: Marijuana Hernan Sida)       Allergies:   Allergies   Allergen Reactions    Acetaminophen Hives    Aspirin Swelling    Ibuprofen Hives    Nsaids Hives       CURRENT MEDICATIONS      Previous Medications    ALBUTEROL SULFATE
Normal for race